# Patient Record
Sex: FEMALE | Race: WHITE | NOT HISPANIC OR LATINO | Employment: OTHER | URBAN - METROPOLITAN AREA
[De-identification: names, ages, dates, MRNs, and addresses within clinical notes are randomized per-mention and may not be internally consistent; named-entity substitution may affect disease eponyms.]

---

## 2017-02-07 PROBLEM — D50.9 IRON DEFICIENCY ANEMIA, UNSPECIFIED: Status: ACTIVE | Noted: 2017-02-07

## 2018-09-18 PROBLEM — D50.0 CHRONIC BLOOD LOSS ANEMIA: Status: ACTIVE | Noted: 2018-09-18

## 2020-04-29 PROBLEM — D64.9 SYMPTOMATIC ANEMIA: Status: ACTIVE | Noted: 2020-04-29

## 2020-04-30 PROBLEM — R06.02 SOB (SHORTNESS OF BREATH): Status: ACTIVE | Noted: 2020-04-30

## 2020-04-30 PROBLEM — J44.1 COPD EXACERBATION: Status: ACTIVE | Noted: 2020-04-30

## 2020-05-01 PROBLEM — R06.00 DYSPNEA: Status: ACTIVE | Noted: 2020-04-30

## 2020-05-03 PROBLEM — I51.89 DIASTOLIC DYSFUNCTION: Status: ACTIVE | Noted: 2020-05-03

## 2020-06-05 PROBLEM — D50.0 IRON DEFICIENCY ANEMIA DUE TO CHRONIC BLOOD LOSS: Status: ACTIVE | Noted: 2020-06-05

## 2021-03-04 PROBLEM — N39.0 UTI (URINARY TRACT INFECTION): Status: ACTIVE | Noted: 2021-03-04

## 2021-03-04 PROBLEM — R11.10 INTRACTABLE VOMITING: Status: ACTIVE | Noted: 2021-03-04

## 2021-03-05 PROBLEM — R06.02 SOB (SHORTNESS OF BREATH): Status: ACTIVE | Noted: 2021-03-05

## 2021-05-06 ENCOUNTER — PATIENT MESSAGE (OUTPATIENT)
Dept: RESEARCH | Facility: HOSPITAL | Age: 76
End: 2021-05-06

## 2021-05-10 ENCOUNTER — PATIENT MESSAGE (OUTPATIENT)
Dept: RESEARCH | Facility: HOSPITAL | Age: 76
End: 2021-05-10

## 2022-05-31 PROBLEM — K21.9 HIATAL HERNIA WITH GERD: Status: ACTIVE | Noted: 2022-05-31

## 2022-05-31 PROBLEM — K21.9 HIATAL HERNIA WITH GASTROESOPHAGEAL REFLUX: Chronic | Status: ACTIVE | Noted: 2022-05-31

## 2022-05-31 PROBLEM — K44.9 HIATAL HERNIA WITH GERD: Status: ACTIVE | Noted: 2022-05-31

## 2022-05-31 PROBLEM — K44.9 HIATAL HERNIA WITH GASTROESOPHAGEAL REFLUX: Chronic | Status: ACTIVE | Noted: 2022-05-31

## 2022-05-31 PROBLEM — K21.9 GASTROESOPHAGEAL REFLUX DISEASE WITHOUT ESOPHAGITIS: Status: ACTIVE | Noted: 2022-05-31

## 2022-05-31 PROBLEM — J43.1 PANLOBULAR EMPHYSEMA: Chronic | Status: ACTIVE | Noted: 2022-05-31

## 2022-05-31 PROBLEM — I10 ESSENTIAL HYPERTENSION: Chronic | Status: ACTIVE | Noted: 2022-05-31

## 2022-06-02 PROBLEM — E63.8 INADEQUATE DIETARY INTAKE OF PROTEIN: Status: ACTIVE | Noted: 2022-06-02

## 2024-09-10 PROBLEM — R29.818 FOCAL NEUROLOGICAL DEFICIT: Status: ACTIVE | Noted: 2024-09-10

## 2024-09-11 PROBLEM — Z79.899 POLYPHARMACY: Status: ACTIVE | Noted: 2024-09-11

## 2024-09-11 PROBLEM — R40.4 ALTERED AWARENESS, TRANSIENT: Status: ACTIVE | Noted: 2024-09-11

## 2024-09-11 PROBLEM — E87.6 HYPOKALEMIA: Status: ACTIVE | Noted: 2024-09-11

## 2024-09-12 PROBLEM — N30.01 ACUTE CYSTITIS WITH HEMATURIA: Status: ACTIVE | Noted: 2024-09-12

## 2024-12-31 ENCOUNTER — HOSPITAL ENCOUNTER (INPATIENT)
Facility: HOSPITAL | Age: 79
LOS: 1 days | Discharge: HOME | DRG: 100 | End: 2025-01-02
Attending: STUDENT IN AN ORGANIZED HEALTH CARE EDUCATION/TRAINING PROGRAM | Admitting: STUDENT IN AN ORGANIZED HEALTH CARE EDUCATION/TRAINING PROGRAM
Payer: MEDICARE

## 2024-12-31 DIAGNOSIS — R07.9 CHEST PAIN: ICD-10-CM

## 2024-12-31 DIAGNOSIS — R56.9 SEIZURES: ICD-10-CM

## 2024-12-31 DIAGNOSIS — R56.9 SEIZURE: Primary | ICD-10-CM

## 2024-12-31 PROBLEM — I50.32 CHRONIC DIASTOLIC HEART FAILURE: Status: ACTIVE | Noted: 2020-05-03

## 2024-12-31 PROBLEM — J44.9 COPD (CHRONIC OBSTRUCTIVE PULMONARY DISEASE): Status: ACTIVE | Noted: 2020-04-30

## 2024-12-31 PROBLEM — G40.909 SEIZURE DISORDER: Status: ACTIVE | Noted: 2024-12-31

## 2024-12-31 PROBLEM — G93.41 ENCEPHALOPATHY, METABOLIC: Status: ACTIVE | Noted: 2024-12-31

## 2024-12-31 PROCEDURE — 63600175 PHARM REV CODE 636 W HCPCS: Performed by: STUDENT IN AN ORGANIZED HEALTH CARE EDUCATION/TRAINING PROGRAM

## 2024-12-31 PROCEDURE — 25000003 PHARM REV CODE 250: Performed by: STUDENT IN AN ORGANIZED HEALTH CARE EDUCATION/TRAINING PROGRAM

## 2024-12-31 PROCEDURE — G0378 HOSPITAL OBSERVATION PER HR: HCPCS

## 2024-12-31 PROCEDURE — G0379 DIRECT REFER HOSPITAL OBSERV: HCPCS

## 2024-12-31 RX ORDER — SODIUM,POTASSIUM PHOSPHATES 280-250MG
2 POWDER IN PACKET (EA) ORAL
Status: DISCONTINUED | OUTPATIENT
Start: 2024-12-31 | End: 2025-01-02 | Stop reason: HOSPADM

## 2024-12-31 RX ORDER — METOPROLOL SUCCINATE 25 MG/1
25 TABLET, EXTENDED RELEASE ORAL DAILY
Status: DISCONTINUED | OUTPATIENT
Start: 2025-01-01 | End: 2025-01-02 | Stop reason: HOSPADM

## 2024-12-31 RX ORDER — BISACODYL 10 MG/1
10 SUPPOSITORY RECTAL DAILY PRN
Status: DISCONTINUED | OUTPATIENT
Start: 2024-12-31 | End: 2025-01-02 | Stop reason: HOSPADM

## 2024-12-31 RX ORDER — LISINOPRIL 20 MG/1
20 TABLET ORAL DAILY
Status: DISCONTINUED | OUTPATIENT
Start: 2025-01-01 | End: 2025-01-02 | Stop reason: HOSPADM

## 2024-12-31 RX ORDER — LEVETIRACETAM 500 MG/1
500 TABLET ORAL 2 TIMES DAILY
Status: DISCONTINUED | OUTPATIENT
Start: 2024-12-31 | End: 2025-01-02 | Stop reason: HOSPADM

## 2024-12-31 RX ORDER — LANOLIN ALCOHOL/MO/W.PET/CERES
800 CREAM (GRAM) TOPICAL
Status: DISCONTINUED | OUTPATIENT
Start: 2024-12-31 | End: 2025-01-02 | Stop reason: HOSPADM

## 2024-12-31 RX ORDER — PANTOPRAZOLE SODIUM 40 MG/1
40 TABLET, DELAYED RELEASE ORAL DAILY
Status: DISCONTINUED | OUTPATIENT
Start: 2025-01-01 | End: 2025-01-02 | Stop reason: HOSPADM

## 2024-12-31 RX ORDER — SIMETHICONE 80 MG
1 TABLET,CHEWABLE ORAL 4 TIMES DAILY PRN
Status: DISCONTINUED | OUTPATIENT
Start: 2024-12-31 | End: 2025-01-02 | Stop reason: HOSPADM

## 2024-12-31 RX ORDER — TALC
6 POWDER (GRAM) TOPICAL NIGHTLY PRN
Status: DISCONTINUED | OUTPATIENT
Start: 2024-12-31 | End: 2025-01-02 | Stop reason: HOSPADM

## 2024-12-31 RX ORDER — ALUMINUM HYDROXIDE, MAGNESIUM HYDROXIDE, AND SIMETHICONE 1200; 120; 1200 MG/30ML; MG/30ML; MG/30ML
30 SUSPENSION ORAL 4 TIMES DAILY PRN
Status: DISCONTINUED | OUTPATIENT
Start: 2024-12-31 | End: 2025-01-02 | Stop reason: HOSPADM

## 2024-12-31 RX ORDER — LORAZEPAM 2 MG/ML
2 INJECTION INTRAMUSCULAR
Status: DISCONTINUED | OUTPATIENT
Start: 2024-12-31 | End: 2025-01-02 | Stop reason: HOSPADM

## 2024-12-31 RX ORDER — CEFTRIAXONE 1 G/1
1 INJECTION, POWDER, FOR SOLUTION INTRAMUSCULAR; INTRAVENOUS
Status: DISCONTINUED | OUTPATIENT
Start: 2024-12-31 | End: 2025-01-02

## 2024-12-31 RX ORDER — ACETAMINOPHEN 325 MG/1
650 TABLET ORAL EVERY 8 HOURS PRN
Status: DISCONTINUED | OUTPATIENT
Start: 2024-12-31 | End: 2025-01-01

## 2024-12-31 RX ORDER — GLUCAGON 1 MG
1 KIT INJECTION
Status: DISCONTINUED | OUTPATIENT
Start: 2024-12-31 | End: 2025-01-02 | Stop reason: HOSPADM

## 2024-12-31 RX ORDER — IBUPROFEN 200 MG
16 TABLET ORAL
Status: DISCONTINUED | OUTPATIENT
Start: 2024-12-31 | End: 2025-01-02 | Stop reason: HOSPADM

## 2024-12-31 RX ORDER — SODIUM CHLORIDE 0.9 % (FLUSH) 0.9 %
10 SYRINGE (ML) INJECTION EVERY 12 HOURS PRN
Status: DISCONTINUED | OUTPATIENT
Start: 2024-12-31 | End: 2025-01-02 | Stop reason: HOSPADM

## 2024-12-31 RX ORDER — NALOXONE HCL 0.4 MG/ML
0.02 VIAL (ML) INJECTION
Status: DISCONTINUED | OUTPATIENT
Start: 2024-12-31 | End: 2025-01-02 | Stop reason: HOSPADM

## 2024-12-31 RX ORDER — ONDANSETRON HYDROCHLORIDE 2 MG/ML
4 INJECTION, SOLUTION INTRAVENOUS EVERY 8 HOURS PRN
Status: DISCONTINUED | OUTPATIENT
Start: 2024-12-31 | End: 2025-01-01

## 2024-12-31 RX ORDER — PRAVASTATIN SODIUM 40 MG/1
40 TABLET ORAL DAILY
Status: DISCONTINUED | OUTPATIENT
Start: 2025-01-01 | End: 2025-01-02 | Stop reason: HOSPADM

## 2024-12-31 RX ORDER — IBUPROFEN 200 MG
24 TABLET ORAL
Status: DISCONTINUED | OUTPATIENT
Start: 2024-12-31 | End: 2025-01-02 | Stop reason: HOSPADM

## 2024-12-31 RX ORDER — AMOXICILLIN 250 MG
1 CAPSULE ORAL DAILY PRN
Status: DISCONTINUED | OUTPATIENT
Start: 2024-12-31 | End: 2025-01-02 | Stop reason: HOSPADM

## 2024-12-31 RX ORDER — ISOSORBIDE MONONITRATE 30 MG/1
30 TABLET, EXTENDED RELEASE ORAL DAILY
Status: DISCONTINUED | OUTPATIENT
Start: 2025-01-01 | End: 2025-01-02 | Stop reason: HOSPADM

## 2024-12-31 RX ORDER — ACETAMINOPHEN 325 MG/1
650 TABLET ORAL EVERY 4 HOURS PRN
Status: DISCONTINUED | OUTPATIENT
Start: 2024-12-31 | End: 2025-01-02 | Stop reason: HOSPADM

## 2024-12-31 RX ADMIN — CEFTRIAXONE 1 G: 1 INJECTION, POWDER, FOR SOLUTION INTRAMUSCULAR; INTRAVENOUS at 09:12

## 2024-12-31 RX ADMIN — LEVETIRACETAM 500 MG: 500 TABLET, FILM COATED ORAL at 09:12

## 2025-01-01 LAB
ALBUMIN SERPL BCP-MCNC: 3.8 G/DL (ref 3.5–5.2)
ALP SERPL-CCNC: 118 U/L (ref 40–150)
ALT SERPL W/O P-5'-P-CCNC: 22 U/L (ref 10–44)
ANION GAP SERPL CALC-SCNC: 15 MMOL/L (ref 8–16)
AST SERPL-CCNC: 31 U/L (ref 10–40)
BASOPHILS # BLD AUTO: 0.02 K/UL (ref 0–0.2)
BASOPHILS NFR BLD: 0.4 % (ref 0–1.9)
BILIRUB SERPL-MCNC: 0.5 MG/DL (ref 0.1–1)
BUN SERPL-MCNC: 9 MG/DL (ref 8–23)
CALCIUM SERPL-MCNC: 8.9 MG/DL (ref 8.7–10.5)
CHLORIDE SERPL-SCNC: 107 MMOL/L (ref 95–110)
CO2 SERPL-SCNC: 21 MMOL/L (ref 23–29)
CREAT SERPL-MCNC: 0.6 MG/DL (ref 0.5–1.4)
DIFFERENTIAL METHOD BLD: ABNORMAL
EOSINOPHIL # BLD AUTO: 0.1 K/UL (ref 0–0.5)
EOSINOPHIL NFR BLD: 1.1 % (ref 0–8)
ERYTHROCYTE [DISTWIDTH] IN BLOOD BY AUTOMATED COUNT: 15 % (ref 11.5–14.5)
EST. GFR  (NO RACE VARIABLE): >60 ML/MIN/1.73 M^2
GLUCOSE SERPL-MCNC: 88 MG/DL (ref 70–110)
HCT VFR BLD AUTO: 36.7 % (ref 37–48.5)
HGB BLD-MCNC: 11.1 G/DL (ref 12–16)
IMM GRANULOCYTES # BLD AUTO: 0.02 K/UL (ref 0–0.04)
IMM GRANULOCYTES NFR BLD AUTO: 0.4 % (ref 0–0.5)
LYMPHOCYTES # BLD AUTO: 0.7 K/UL (ref 1–4.8)
LYMPHOCYTES NFR BLD: 12.6 % (ref 18–48)
MAGNESIUM SERPL-MCNC: 1.8 MG/DL (ref 1.6–2.6)
MCH RBC QN AUTO: 29.4 PG (ref 27–31)
MCHC RBC AUTO-ENTMCNC: 30.2 G/DL (ref 32–36)
MCV RBC AUTO: 97 FL (ref 82–98)
MONOCYTES # BLD AUTO: 0.4 K/UL (ref 0.3–1)
MONOCYTES NFR BLD: 7.5 % (ref 4–15)
NEUTROPHILS # BLD AUTO: 4.2 K/UL (ref 1.8–7.7)
NEUTROPHILS NFR BLD: 78 % (ref 38–73)
NRBC BLD-RTO: 0 /100 WBC
PHOSPHATE SERPL-MCNC: 2.2 MG/DL (ref 2.7–4.5)
PLATELET # BLD AUTO: 189 K/UL (ref 150–450)
PMV BLD AUTO: 10 FL (ref 9.2–12.9)
POTASSIUM SERPL-SCNC: 2.8 MMOL/L (ref 3.5–5.1)
PROT SERPL-MCNC: 7.1 G/DL (ref 6–8.4)
RBC # BLD AUTO: 3.78 M/UL (ref 4–5.4)
SODIUM SERPL-SCNC: 143 MMOL/L (ref 136–145)
WBC # BLD AUTO: 5.32 K/UL (ref 3.9–12.7)

## 2025-01-01 PROCEDURE — 84100 ASSAY OF PHOSPHORUS: CPT | Performed by: STUDENT IN AN ORGANIZED HEALTH CARE EDUCATION/TRAINING PROGRAM

## 2025-01-01 PROCEDURE — 25000003 PHARM REV CODE 250: Performed by: HOSPITALIST

## 2025-01-01 PROCEDURE — 25000003 PHARM REV CODE 250: Performed by: STUDENT IN AN ORGANIZED HEALTH CARE EDUCATION/TRAINING PROGRAM

## 2025-01-01 PROCEDURE — 83735 ASSAY OF MAGNESIUM: CPT | Performed by: STUDENT IN AN ORGANIZED HEALTH CARE EDUCATION/TRAINING PROGRAM

## 2025-01-01 PROCEDURE — 36415 COLL VENOUS BLD VENIPUNCTURE: CPT | Performed by: STUDENT IN AN ORGANIZED HEALTH CARE EDUCATION/TRAINING PROGRAM

## 2025-01-01 PROCEDURE — 11000001 HC ACUTE MED/SURG PRIVATE ROOM

## 2025-01-01 PROCEDURE — 85025 COMPLETE CBC W/AUTO DIFF WBC: CPT | Performed by: STUDENT IN AN ORGANIZED HEALTH CARE EDUCATION/TRAINING PROGRAM

## 2025-01-01 PROCEDURE — 63600175 PHARM REV CODE 636 W HCPCS: Performed by: HOSPITALIST

## 2025-01-01 PROCEDURE — 80053 COMPREHEN METABOLIC PANEL: CPT | Performed by: STUDENT IN AN ORGANIZED HEALTH CARE EDUCATION/TRAINING PROGRAM

## 2025-01-01 PROCEDURE — G0426 INPT/ED TELECONSULT50: HCPCS | Mod: GT,,, | Performed by: PSYCHIATRY & NEUROLOGY

## 2025-01-01 PROCEDURE — 63600175 PHARM REV CODE 636 W HCPCS: Performed by: STUDENT IN AN ORGANIZED HEALTH CARE EDUCATION/TRAINING PROGRAM

## 2025-01-01 RX ORDER — DOCUSATE SODIUM 100 MG/1
100 CAPSULE, LIQUID FILLED ORAL DAILY
Status: DISCONTINUED | OUTPATIENT
Start: 2025-01-01 | End: 2025-01-02 | Stop reason: HOSPADM

## 2025-01-01 RX ORDER — POLYETHYLENE GLYCOL 3350 17 G/17G
17 POWDER, FOR SOLUTION ORAL 2 TIMES DAILY PRN
Status: DISCONTINUED | OUTPATIENT
Start: 2025-01-01 | End: 2025-01-02 | Stop reason: HOSPADM

## 2025-01-01 RX ORDER — ONDANSETRON HYDROCHLORIDE 2 MG/ML
4 INJECTION, SOLUTION INTRAVENOUS EVERY 4 HOURS PRN
Status: DISCONTINUED | OUTPATIENT
Start: 2025-01-01 | End: 2025-01-02 | Stop reason: HOSPADM

## 2025-01-01 RX ORDER — ACETAMINOPHEN 325 MG/1
650 TABLET ORAL EVERY 6 HOURS PRN
Status: DISCONTINUED | OUTPATIENT
Start: 2025-01-01 | End: 2025-01-02 | Stop reason: HOSPADM

## 2025-01-01 RX ORDER — MIRTAZAPINE 7.5 MG/1
7.5 TABLET, FILM COATED ORAL NIGHTLY PRN
Status: DISCONTINUED | OUTPATIENT
Start: 2025-01-01 | End: 2025-01-02 | Stop reason: HOSPADM

## 2025-01-01 RX ORDER — POTASSIUM CHLORIDE 20 MEQ/1
60 TABLET, EXTENDED RELEASE ORAL 2 TIMES DAILY
Status: COMPLETED | OUTPATIENT
Start: 2025-01-01 | End: 2025-01-01

## 2025-01-01 RX ORDER — POTASSIUM CHLORIDE 20 MEQ/1
60 TABLET, EXTENDED RELEASE ORAL 2 TIMES DAILY
Status: DISCONTINUED | OUTPATIENT
Start: 2025-01-01 | End: 2025-01-01

## 2025-01-01 RX ORDER — DIPHENHYDRAMINE HCL 25 MG
50 CAPSULE ORAL EVERY 6 HOURS PRN
Status: DISCONTINUED | OUTPATIENT
Start: 2025-01-01 | End: 2025-01-02 | Stop reason: HOSPADM

## 2025-01-01 RX ORDER — BENZONATATE 100 MG/1
100 CAPSULE ORAL 3 TIMES DAILY PRN
Status: DISCONTINUED | OUTPATIENT
Start: 2025-01-01 | End: 2025-01-02 | Stop reason: HOSPADM

## 2025-01-01 RX ORDER — GUAIFENESIN AND DEXTROMETHORPHAN HYDROBROMIDE 10; 100 MG/5ML; MG/5ML
5 SYRUP ORAL EVERY 6 HOURS PRN
Status: DISCONTINUED | OUTPATIENT
Start: 2025-01-01 | End: 2025-01-02 | Stop reason: HOSPADM

## 2025-01-01 RX ADMIN — CEFTRIAXONE 1 G: 1 INJECTION, POWDER, FOR SOLUTION INTRAMUSCULAR; INTRAVENOUS at 09:01

## 2025-01-01 RX ADMIN — METOPROLOL SUCCINATE 25 MG: 25 TABLET, EXTENDED RELEASE ORAL at 09:01

## 2025-01-01 RX ADMIN — LISINOPRIL 20 MG: 20 TABLET ORAL at 09:01

## 2025-01-01 RX ADMIN — PRAVASTATIN SODIUM 40 MG: 40 TABLET ORAL at 09:01

## 2025-01-01 RX ADMIN — LEVETIRACETAM 500 MG: 500 TABLET, FILM COATED ORAL at 09:01

## 2025-01-01 RX ADMIN — ISOSORBIDE MONONITRATE 30 MG: 30 TABLET, EXTENDED RELEASE ORAL at 09:01

## 2025-01-01 RX ADMIN — PANTOPRAZOLE SODIUM 40 MG: 40 TABLET, DELAYED RELEASE ORAL at 09:01

## 2025-01-01 RX ADMIN — POTASSIUM CHLORIDE 60 MEQ: 1500 TABLET, EXTENDED RELEASE ORAL at 09:01

## 2025-01-01 RX ADMIN — DOCUSATE SODIUM 100 MG: 100 CAPSULE, LIQUID FILLED ORAL at 02:01

## 2025-01-01 RX ADMIN — ONDANSETRON 4 MG: 2 INJECTION INTRAMUSCULAR; INTRAVENOUS at 05:01

## 2025-01-01 NOTE — SUBJECTIVE & OBJECTIVE
Past Medical History:   Diagnosis Date    Anemia     Arthritis     Asthma with status asthmaticus     C. difficile colitis     history    COPD (chronic obstructive pulmonary disease)     Depression     Encounter for blood transfusion     Esophageal stricture     Gastritis     GERD (gastroesophageal reflux disease)     Hiatal hernia     HTN (hypertension)     Hypercholesterolemia     Iron deficiency     Irregular heart beat     Migraine     Mitral valve prolapse     Osteopenia determined by x-ray     Reflux esophagitis     Unspecified asthma        Past Surgical History:   Procedure Laterality Date    BLADDER SUSPENSION      CATARACT EXTRACTION      COLONOSCOPY      COLONOSCOPY N/A 9/18/2018    Procedure: COLONOSCOPY;  Surgeon: Vadim Jacob MD;  Location: [facility];  Service: Endoscopy;  Laterality: N/A;    COLONOSCOPY N/A 6/5/2020    Procedure: COLONOSCOPY;  Surgeon: Vadim Jacob MD;  Location: [facility];  Service: Endoscopy;  Laterality: N/A;    DILATION AND CURETTAGE OF UTERUS      DILATION AND CURETTAGE OF UTERUS      ESOPHAGEAL DILATION      ESOPHAGOGASTRODUODENOSCOPY      ESOPHAGOGASTRODUODENOSCOPY N/A 9/18/2018    Procedure: ESOPHAGOGASTRODUODENOSCOPY (EGD);  Surgeon: Vadim Jacob MD;  Location: [facility];  Service: Endoscopy;  Laterality: N/A;    ESOPHAGOGASTRODUODENOSCOPY N/A 6/5/2020    Procedure: ESOPHAGOGASTRODUODENOSCOPY (EGD);  Surgeon: Vadim Jacob MD;  Location: [facility];  Service: Endoscopy;  Laterality: N/A;    FIXATION KYPHOPLASTY LUMBAR SPINE      HYSTERECTOMY      intestinal obstruction surgery  1995    TOTAL ABDOMINAL HYSTERECTOMY W/ BILATERAL SALPINGOOPHORECTOMY         Review of patient's allergies indicates:   Allergen Reactions    Bactrim [sulfamethoxazole-trimethoprim] Anaphylaxis    Dilaudid (pf) [hydromorphone (pf)] Anaphylaxis    Hydromorphone Anaphylaxis    Meperidine Anaphylaxis    Penicillins Anaphylaxis    Bananas [banana] Diarrhea    Ciprofloxacin      Eggs [egg derived] Diarrhea    Iodine and iodide containing products     Milk containing products (dairy) Diarrhea    Pork/porcine containing products Diarrhea    Demerol (pf) [meperidine (pf)] Palpitations    Kenalog-h Palpitations    Percodan [oxycodone-aspirin] Palpitations       Current Facility-Administered Medications on File Prior to Encounter   Medication    diphenhydrAMINE (BENADRYL) 50 mg/mL injection    [COMPLETED] etomidate injection 15 mg    [COMPLETED] fentaNYL 50 mcg/mL injection 200 mcg    [COMPLETED] levETIRAcetam in NaCl (iso-os) IVPB 2,000 mg    methylPREDNISolone sodium succinate (SOLU-MEDROL) 125 mg injection    ondansetron 4 mg/2 mL injection    [COMPLETED] rocuronium injection 50 mg    [DISCONTINUED] dexmedetomidine (PRECEDEX) 400mcg/100mL 0.9% NaCL infusion    [DISCONTINUED] etomidate injection 10 mg    [DISCONTINUED] levETIRAcetam in NaCl (iso-os) IVPB 1,000 mg    [DISCONTINUED] LORazepam (ATIVAN) 2 mg/mL injection    [DISCONTINUED] propofol (DIPRIVAN) 10 mg/mL infusion    [DISCONTINUED] propofol (DIPRIVAN) 10 mg/mL infusion    [DISCONTINUED] rocuronium injection 3,140 mg     Current Outpatient Medications on File Prior to Encounter   Medication Sig    acetaminophen (TYLENOL) 325 MG tablet Take 2 tablets (650 mg total) by mouth every 6 (six) hours as needed for Pain.    albuterol (PROVENTIL/VENTOLIN HFA) 90 mcg/actuation inhaler Inhale 2 puffs into the lungs every 6 (six) hours as needed for Wheezing.    albuterol-ipratropium (DUO-NEB) 2.5 mg-0.5 mg/3 mL nebulizer solution Take 3 mLs by nebulization every 6 (six) hours. Rescue    docusate sodium (COLACE) 100 MG capsule Take 100 mg by mouth daily as needed for Constipation.    ferrous gluconate (FERGON) 324 MG tablet Take 1 tablet (324 mg total) by mouth daily with breakfast.    isosorbide mononitrate (IMDUR) 30 MG 24 hr tablet Take 30 mg by mouth once daily.     Lactobacillus rhamnosus GG (CULTURELLE) 10 billion cell capsule Take 1 capsule  by mouth once daily.    lisinopril (PRINIVIL,ZESTRIL) 20 MG tablet Take 20 mg by mouth once daily.    metoprolol succinate (TOPROL-XL) 25 MG 24 hr tablet Take 1 tablet by mouth once daily.    OXYGEN-AIR DELIVERY SYSTEMS MISC by [facility].(Non-Drug; Combo Route) route.    pantoprazole (PROTONIX) 40 MG tablet Take 40 mg by mouth once daily.    pravastatin (PRAVACHOL) 40 MG tablet Take 40 mg by mouth once daily.    predniSONE (DELTASONE) 20 MG tablet Take 3 tablets (60 mg total) by mouth once daily. for 5 days    SYMBICORT 160-4.5 mcg/actuation HFAA Inhale 1 puff into the lungs 2 (two) times daily.     Family History       Problem Relation (Age of Onset)    Heart disease Mother, Father          Tobacco Use    Smoking status: Never    Smokeless tobacco: Never   Substance and Sexual Activity    Alcohol use: Yes     Comment: rare    Drug use: No    Sexual activity: Not Currently     Review of Systems   Unable to perform ROS: Mental status change     Objective:     Vital Signs (Most Recent):  Temp: 98.8 °F (37.1 °C) (12/31/24 2044)  Pulse: 98 (12/31/24 2044)  Resp: 18 (12/31/24 2044)  BP: (!) 173/79 (12/31/24 2044)  SpO2: 100 % (12/31/24 2044) Vital Signs (24h Range):  Temp:  [98 °F (36.7 °C)-98.8 °F (37.1 °C)] 98.8 °F (37.1 °C)  Pulse:  [] 98  Resp:  [6-25] 18  SpO2:  [97 %-100 %] 100 %  BP: ()/() 173/79        There is no height or weight on file to calculate BMI.     Physical Exam  Vitals and nursing note reviewed.   Constitutional:       General: She is not in acute distress.     Appearance: She is not ill-appearing.   HENT:      Mouth/Throat:      Mouth: Mucous membranes are moist.   Cardiovascular:      Rate and Rhythm: Normal rate.   Pulmonary:      Effort: Pulmonary effort is normal.   Abdominal:      General: Abdomen is flat.   Skin:     General: Skin is warm.   Neurological:      Mental Status: She is alert.      Comments: A&Ox1. Confused.   Able to move all extremities   Slow response to following  some commands.                   Significant Labs: All pertinent labs within the past 24 hours have been reviewed.    Significant Imaging: I have reviewed all pertinent imaging results/findings within the past 24 hours.

## 2025-01-01 NOTE — ASSESSMENT & PLAN NOTE
- Echocardiogram (4/30/2020):   Final Impressions   1. The study quality is good.   2. Global left ventricular systolic function is normal. The left   ventricular ejection fraction is 55-60%.    3. Right ventricular systolic function is normal. Right ventricular   diastolic dimension is 3 cms.    4. The left atrium is severely enlarged. Left atrial diameter is 4.7   cms.    5. Mild (1+) mitral regurgitation. Mild (1+) tricuspid regurgitation.   6. The pulmonary artery systolic pressure is 40 mmHg.   7. LV wall motion and systolic function are normal with abnormal LV   diastolic function.     No acute issues

## 2025-01-01 NOTE — HPI
This is an 79-year-old female with a past medical history of COPD, hypertension, HFpEF (EF: 55%), SVT, seizure disorder, depression, GERD, who presents with altered mental status.    Patient presented to [facility] ED for evaluation of altered mental status that started a day prior to presentation.  Reportedly, the patient was acting bizarre, nonverbal at times and wandering around the house aimlessly.  Family denies prior history of seizures. She had a presentation to the ED on 12/29 for shortness of breath and was diagnosed with a COPD exacerbation, for which he was prescribed prednisone and albuterol, however, did not take her prednisone.     In the ED, patient was tachycardic (110s > 100s), with fluctuating blood pressure (90-190s/50s-120s).  Labs were remarkable for normocytic anemia (10.5), but otherwise unremarkable.  UDS was positive for opiates and barbiturates.  UA showed +2 leukocyte esterase, 45 WBCs, occasional bacteria.  CT head showed no acute intracranial abnormality.  While in CT, the patient had a tonic-clonic seizure that lasted for about 3-5 minutes and was intubated for airway protection.  She was placed on propofol and Precedex, that was later awake and following commands.  Sedation was subsequently held and she was extubated shortly after.  Patient was given Keppra 2 g IV, along with paralytic/sedation required for intubation.  She was transferred to [facility] for further management.

## 2025-01-01 NOTE — ASSESSMENT & PLAN NOTE
Could be in the setting of seizure disorder/possible UTI  CT head showed no acute process.   Brain MRI ordered  Neurology consulted

## 2025-01-01 NOTE — PLAN OF CARE
Inpatient Upgrade Note    Nanette Bucio has warranted treatment spanning two or more midnights of hospital level care for the management of  UTI . She continues to require IV antibiotics. Her condition is also complicated by the following comorbidities: Hypertension, Chronic respiratory disease, and Heart failure.

## 2025-01-01 NOTE — NURSING
[facility]  Nurses Note -- 4 Eyes      1/1/2025       Skin assessed on: Q Shift      [x] No Pressure Injuries Present    [x]Prevention Measures Documented    [] Yes LDA  for Pressure Injury Previously documented     [] Yes New Pressure Injury Discovered   [] LDA for New Pressure Injury Added      Attending RN:  Antonella Harris RN     Second RN:  Juana DELCID RN

## 2025-01-01 NOTE — TELEMEDICINE CONSULT
[facility]   General Neurology  Consult Note      Consult Information  Inpatient consult to Telemedicine-General Neurology  Consult performed by: Zachariah Hampton MD  Consult ordered by: Jessica Goddard MD  Reason for consult: Seizure        Consulting Provider: JOEL BRAY III   Current Providers  No providers found    Patient Location:  [facility] TELEMET[facility] IP [facility]    Spoke hospital nurse at bedside with patient assisting consultant.  Patient information was obtained from patient.       Neurology Documentation       Blood pressure 137/80, pulse 76, temperature 98.4 °F (36.9 °C), temperature source Oral, resp. rate 12, height 5' (1.524 m), weight 62.8 kg (138 lb 7.2 oz), SpO2 100%.    Medical Decision Making  HPI:  79 y.o. female with medical Hx of COPD, depression, migraine, GERD was brought to ED due to confusion. Pt was at an outside facility initially. She was sent to head CT where reportedly had a seizure that lasted around 5 minutes. Per notes pt was intubated for a short period of time. Pt was then transferred to [facility] for neurology evaluation. She is now alert and fully oriented. No new medications except Norco.     Images personally reviewed and interpreted:  Study: Head CT  Study Interpretation: FINDINGS:  No evidence of an acute hemorrhage, infarction or mass effect.  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  Old lacunar infarct or dilated perivascular space inferior margin right lentiform nucleus.     Some of the images are degraded secondary to motion artifact.     Impression:     No acute intracranial abnormalities     No significant change from the prior study        Electronically signed by:Rahel Encinas MD  Date:                                            12/31/2024  Time:                                           08:30    Additional studies reviewed:   Study: Cardiac_Neuro: EEG  Study Interpretation: Pending    Laboratory studies reviewed:  BMP:   Lab Results    Component Value Date    GLUCOSE 88 12/29/2021     01/01/2025    K 2.8 (L) 01/01/2025     01/01/2025    CO2 21 (L) 01/01/2025    BUN 9 01/01/2025    CREATININE 0.6 01/01/2025    CALCIUM 8.9 01/01/2025     CBC:   Lab Results   Component Value Date    WBC 5.32 01/01/2025    RBC 3.78 (L) 01/01/2025    HGB 11.1 (L) 01/01/2025    HCT 36.7 (L) 01/01/2025     01/01/2025    MCV 97 01/01/2025    MCH 29.4 01/01/2025    MCHC 30.2 (L) 01/01/2025     Lipid Panel:   Lab Results   Component Value Date    CHOL 191 09/10/2024    LDLCALC 87 09/10/2024    HDL 79 (H) 09/10/2024    TRIG 124 09/10/2024     Coagulation:   Lab Results   Component Value Date    INR 1.0 09/10/2024    APTT 25.8 09/10/2024     Hgb A1C:   Lab Results   Component Value Date    HGBA1C 4.6 03/05/2021     TSH:   Lab Results   Component Value Date    TSH 4.43 07/24/2024       Documentation personally reviewed:  Notes: Notes: ED notes and H&P     Assessment and plan:    80 y/o female with reported new-onset seizures. Etiology if unclear. No metabolic disturbances. She is on ceftriaxone for UTI. Noted barbiturates in UDS but none on her list of home meds.    -OK for levetiracetam 500 gm BID  -MRI brain to see is any structural abnormalities that may cause seizures  -EEG.    Post charge discharge plan:  Clinic follow up: General Neurology    Visit Type: in-person only  Timeframe: 4 weeks        Additional Physical Exam, History, & ROS  Review of Systems   Constitutional:  Negative for fever.   HENT:  Negative for hearing loss.    Eyes:  Negative for blurred vision.   Respiratory:  Negative for cough.    Cardiovascular:  Negative for chest pain.   Gastrointestinal:  Negative for abdominal pain.   Genitourinary:  Negative for dysuria.   Musculoskeletal:  Negative for neck pain.   Skin:  Negative for rash.   Neurological:  Negative for headaches.   Psychiatric/Behavioral:  Negative for suicidal ideas.      Physical Exam  Constitutional:       General:  She is not in acute distress.  Pulmonary:      Effort: No respiratory distress.   Neurological:      Mental Status: She is alert and oriented to person, place, and time.      Cranial Nerves: No dysarthria or facial asymmetry.      Motor: No tremor or pronator drift.   Past Medical History:   Diagnosis Date    Anemia     Arthritis     Asthma with status asthmaticus     C. difficile colitis     history    COPD (chronic obstructive pulmonary disease)     Depression     Encounter for blood transfusion     Esophageal stricture     Gastritis     GERD (gastroesophageal reflux disease)     Hiatal hernia     HTN (hypertension)     Hypercholesterolemia     Iron deficiency     Irregular heart beat     Migraine     Mitral valve prolapse     Osteopenia determined by x-ray     Reflux esophagitis     Unspecified asthma      Past Surgical History:   Procedure Laterality Date    BLADDER SUSPENSION      CATARACT EXTRACTION      COLONOSCOPY      COLONOSCOPY N/A 9/18/2018    Procedure: COLONOSCOPY;  Surgeon: Vadim Jacob MD;  Location: [facility];  Service: Endoscopy;  Laterality: N/A;    COLONOSCOPY N/A 6/5/2020    Procedure: COLONOSCOPY;  Surgeon: Vadim Jacob MD;  Location: [facility];  Service: Endoscopy;  Laterality: N/A;    DILATION AND CURETTAGE OF UTERUS      DILATION AND CURETTAGE OF UTERUS      ESOPHAGEAL DILATION      ESOPHAGOGASTRODUODENOSCOPY      ESOPHAGOGASTRODUODENOSCOPY N/A 9/18/2018    Procedure: ESOPHAGOGASTRODUODENOSCOPY (EGD);  Surgeon: Vadim Jacob MD;  Location: [facility];  Service: Endoscopy;  Laterality: N/A;    ESOPHAGOGASTRODUODENOSCOPY N/A 6/5/2020    Procedure: ESOPHAGOGASTRODUODENOSCOPY (EGD);  Surgeon: Vadim Jacob MD;  Location: [facility];  Service: Endoscopy;  Laterality: N/A;    FIXATION KYPHOPLASTY LUMBAR SPINE      HYSTERECTOMY      intestinal obstruction surgery  1995    TOTAL ABDOMINAL HYSTERECTOMY W/ BILATERAL SALPINGOOPHORECTOMY        Family History   Problem Relation Name Age of Onset    Heart disease Mother      Heart disease Father         Diagnoses  No problems updated.    Zachariah Hampton MD    Neurology consultation requested by spoke provider. Audiovisual encounter with the patient performed using a secure connection.  Results and impressions from the visit are documented on this note and were communicated to the consulting provider/team via direct communication. The note has been shared for addition to the patients electronic medical record.

## 2025-01-01 NOTE — H&P
[facility] Medicine  History & Physical    Patient Name: Nanette Bucio  MRN: [id number]  Patient Class: OP- Observation  Admission Date: 12/31/2024  Attending Physician: Thomas Jacobs MD   Primary Care Provider: Goran Le MD         Patient information was obtained from patient, relative(s), and ER records.     Subjective:     Principal Problem:Seizure    Chief Complaint: No chief complaint on file.       HPI: This is an 79-year-old female with a past medical history of COPD, hypertension, HFpEF (EF: 55%), SVT, seizure disorder, depression, GERD, who presents with altered mental status.    Patient presented to [facility] ED for evaluation of altered mental status that started a day prior to presentation.  Reportedly, the patient was acting bizarre, nonverbal at times and wandering around the house aimlessly.  Family denies prior history of seizures. She had a presentation to the ED on 12/29 for shortness of breath and was diagnosed with a COPD exacerbation, for which he was prescribed prednisone and albuterol, however, did not take her prednisone.     In the ED, patient was tachycardic (110s > 100s), with fluctuating blood pressure (90-190s/50s-120s).  Labs were remarkable for normocytic anemia (10.5), but otherwise unremarkable.  UDS was positive for opiates and barbiturates.  UA showed +2 leukocyte esterase, 45 WBCs, occasional bacteria.  CT head showed no acute intracranial abnormality.  While in CT, the patient had a tonic-clonic seizure that lasted for about 3-5 minutes and was intubated for airway protection.  She was placed on propofol and Precedex, that was later awake and following commands.  Sedation was subsequently held and she was extubated shortly after.  Patient was given Keppra 2 g IV, along with paralytic/sedation required for intubation.  She was transferred to [facility] for further management.    Past Medical History:   Diagnosis Date    Anemia      Arthritis     Asthma with status asthmaticus     C. difficile colitis     history    COPD (chronic obstructive pulmonary disease)     Depression     Encounter for blood transfusion     Esophageal stricture     Gastritis     GERD (gastroesophageal reflux disease)     Hiatal hernia     HTN (hypertension)     Hypercholesterolemia     Iron deficiency     Irregular heart beat     Migraine     Mitral valve prolapse     Osteopenia determined by x-ray     Reflux esophagitis     Unspecified asthma        Past Surgical History:   Procedure Laterality Date    BLADDER SUSPENSION      CATARACT EXTRACTION      COLONOSCOPY      COLONOSCOPY N/A 9/18/2018    Procedure: COLONOSCOPY;  Surgeon: Vadim Jacob MD;  Location: [facility];  Service: Endoscopy;  Laterality: N/A;    COLONOSCOPY N/A 6/5/2020    Procedure: COLONOSCOPY;  Surgeon: Vadim Jacob MD;  Location: [facility];  Service: Endoscopy;  Laterality: N/A;    DILATION AND CURETTAGE OF UTERUS      DILATION AND CURETTAGE OF UTERUS      ESOPHAGEAL DILATION      ESOPHAGOGASTRODUODENOSCOPY      ESOPHAGOGASTRODUODENOSCOPY N/A 9/18/2018    Procedure: ESOPHAGOGASTRODUODENOSCOPY (EGD);  Surgeon: Vadim Jacob MD;  Location: [facility];  Service: Endoscopy;  Laterality: N/A;    ESOPHAGOGASTRODUODENOSCOPY N/A 6/5/2020    Procedure: ESOPHAGOGASTRODUODENOSCOPY (EGD);  Surgeon: Vadim Jacob MD;  Location: [facility];  Service: Endoscopy;  Laterality: N/A;    FIXATION KYPHOPLASTY LUMBAR SPINE      HYSTERECTOMY      intestinal obstruction surgery  1995    TOTAL ABDOMINAL HYSTERECTOMY W/ BILATERAL SALPINGOOPHORECTOMY         Review of patient's allergies indicates:   Allergen Reactions    Bactrim [sulfamethoxazole-trimethoprim] Anaphylaxis    Dilaudid (pf) [hydromorphone (pf)] Anaphylaxis    Hydromorphone Anaphylaxis    Meperidine Anaphylaxis    Penicillins Anaphylaxis    Bananas [banana] Diarrhea    Ciprofloxacin     Eggs [egg derived] Diarrhea    Iodine and iodide  containing products     Milk containing products (dairy) Diarrhea    Pork/porcine containing products Diarrhea    Demerol (pf) [meperidine (pf)] Palpitations    Kenalog-h Palpitations    Percodan [oxycodone-aspirin] Palpitations       Current Facility-Administered Medications on File Prior to Encounter   Medication    diphenhydrAMINE (BENADRYL) 50 mg/mL injection    [COMPLETED] etomidate injection 15 mg    [COMPLETED] fentaNYL 50 mcg/mL injection 200 mcg    [COMPLETED] levETIRAcetam in NaCl (iso-os) IVPB 2,000 mg    methylPREDNISolone sodium succinate (SOLU-MEDROL) 125 mg injection    ondansetron 4 mg/2 mL injection    [COMPLETED] rocuronium injection 50 mg    [DISCONTINUED] dexmedetomidine (PRECEDEX) 400mcg/100mL 0.9% NaCL infusion    [DISCONTINUED] etomidate injection 10 mg    [DISCONTINUED] levETIRAcetam in NaCl (iso-os) IVPB 1,000 mg    [DISCONTINUED] LORazepam (ATIVAN) 2 mg/mL injection    [DISCONTINUED] propofol (DIPRIVAN) 10 mg/mL infusion    [DISCONTINUED] propofol (DIPRIVAN) 10 mg/mL infusion    [DISCONTINUED] rocuronium injection 3,140 mg     Current Outpatient Medications on File Prior to Encounter   Medication Sig    acetaminophen (TYLENOL) 325 MG tablet Take 2 tablets (650 mg total) by mouth every 6 (six) hours as needed for Pain.    albuterol (PROVENTIL/VENTOLIN HFA) 90 mcg/actuation inhaler Inhale 2 puffs into the lungs every 6 (six) hours as needed for Wheezing.    albuterol-ipratropium (DUO-NEB) 2.5 mg-0.5 mg/3 mL nebulizer solution Take 3 mLs by nebulization every 6 (six) hours. Rescue    docusate sodium (COLACE) 100 MG capsule Take 100 mg by mouth daily as needed for Constipation.    ferrous gluconate (FERGON) 324 MG tablet Take 1 tablet (324 mg total) by mouth daily with breakfast.    isosorbide mononitrate (IMDUR) 30 MG 24 hr tablet Take 30 mg by mouth once daily.     Lactobacillus rhamnosus GG (CULTURELLE) 10 billion cell capsule Take 1 capsule by mouth once daily.    lisinopril  (PRINIVIL,ZESTRIL) 20 MG tablet Take 20 mg by mouth once daily.    metoprolol succinate (TOPROL-XL) 25 MG 24 hr tablet Take 1 tablet by mouth once daily.    OXYGEN-AIR DELIVERY SYSTEMS MISC by [facility].(Non-Drug; Combo Route) route.    pantoprazole (PROTONIX) 40 MG tablet Take 40 mg by mouth once daily.    pravastatin (PRAVACHOL) 40 MG tablet Take 40 mg by mouth once daily.    predniSONE (DELTASONE) 20 MG tablet Take 3 tablets (60 mg total) by mouth once daily. for 5 days    SYMBICORT 160-4.5 mcg/actuation HFAA Inhale 1 puff into the lungs 2 (two) times daily.     Family History       Problem Relation (Age of Onset)    Heart disease Mother, Father          Tobacco Use    Smoking status: Never    Smokeless tobacco: Never   Substance and Sexual Activity    Alcohol use: Yes     Comment: rare    Drug use: No    Sexual activity: Not Currently     Review of Systems   Unable to perform ROS: Mental status change     Objective:     Vital Signs (Most Recent):  Temp: 98.8 °F (37.1 °C) (12/31/24 2044)  Pulse: 98 (12/31/24 2044)  Resp: 18 (12/31/24 2044)  BP: (!) 173/79 (12/31/24 2044)  SpO2: 100 % (12/31/24 2044) Vital Signs (24h Range):  Temp:  [98 °F (36.7 °C)-98.8 °F (37.1 °C)] 98.8 °F (37.1 °C)  Pulse:  [] 98  Resp:  [6-25] 18  SpO2:  [97 %-100 %] 100 %  BP: ()/() 173/79        There is no height or weight on file to calculate BMI.     Physical Exam  Vitals and nursing note reviewed.   Constitutional:       General: She is not in acute distress.     Appearance: She is not ill-appearing.   HENT:      Mouth/Throat:      Mouth: Mucous membranes are moist.   Cardiovascular:      Rate and Rhythm: Normal rate.   Pulmonary:      Effort: Pulmonary effort is normal.   Abdominal:      General: Abdomen is flat.   Skin:     General: Skin is warm.   Neurological:      Mental Status: She is alert.      Comments: A&Ox1. Confused.   Able to move all extremities   Slow response to following some commands.                    Significant Labs: All pertinent labs within the past 24 hours have been reviewed.    Significant Imaging: I have reviewed all pertinent imaging results/findings within the past 24 hours.  Assessment/Plan:     * Seizure  Continue Keppra   Neurology consult   EEG   Seizure precautions   Ativan PRN     Encephalopathy, metabolic  Could be in the setting of seizure disorder/possible UTI  CT head showed no acute process.   Brain MRI ordered  Neurology consulted     Essential hypertension  Patient's blood pressure range in the last 24 hours was: BP  Min: 95/62  Max: 191/87.The patient's inpatient anti-hypertensive regimen is listed below:  Current Antihypertensives       Plan  - BP is controlled, no changes needed to their regimen      Hiatal hernia with GERD  Continue Protonix       UTI (urinary tract infection)  Urinalysis  Recent Labs   Lab 12/31/24  0234   COLORU Yellow   SPECGRAV 1.025   PHUR 5.5   PROTEINUA Trace*   BACTERIA Occasional   NITRITE Negative   LEUKOCYTESUR 2+*   UROBILINOGEN Negative       Will keep on empiric ceftriaxone   Follow up cultures, de-escalate when appropriate       Chronic diastolic heart failure  - Echocardiogram (4/30/2020):   Final Impressions   1. The study quality is good.   2. Global left ventricular systolic function is normal. The left   ventricular ejection fraction is 55-60%.    3. Right ventricular systolic function is normal. Right ventricular   diastolic dimension is 3 cms.    4. The left atrium is severely enlarged. Left atrial diameter is 4.7   cms.    5. Mild (1+) mitral regurgitation. Mild (1+) tricuspid regurgitation.   6. The pulmonary artery systolic pressure is 40 mmHg.   7. LV wall motion and systolic function are normal with abnormal LV   diastolic function.     No acute issues     COPD (chronic obstructive pulmonary disease)  Patient's COPD is controlled currently.  Patient is currently off COPD Pathway.  Duo-Nebs PRN       VTE Risk Mitigation (From admission,  onward)           Ordered     IP VTE HIGH RISK PATIENT  Once         12/31/24 2048     Place sequential compression device  Until discontinued         12/31/24 2048                       On 12/31/2024, patient should be placed in hospital observation services under my care.             Diego Amin MD  Department of Hospital Medicine  [facility]

## 2025-01-01 NOTE — ASSESSMENT & PLAN NOTE
Urinalysis  Recent Labs   Lab 12/31/24  0234   COLORU Yellow   SPECGRAV 1.025   PHUR 5.5   PROTEINUA Trace*   BACTERIA Occasional   NITRITE Negative   LEUKOCYTESUR 2+*   UROBILINOGEN Negative       Will keep on empiric ceftriaxone   Follow up cultures, de-escalate when appropriate

## 2025-01-01 NOTE — SUBJECTIVE & OBJECTIVE
HPI:  79 y.o. female with medical Hx of COPD, depression, migraine, GERD was brought to ED due to confusion. Pt was at an outside facility initially. She was sent to head CT where reportedly had a seizure that lasted around 5 minutes. Per notes pt was intubated for a short period of time. Pt was then transferred to [facility] for neurology evaluation. She is now alert and fully oriented. No new medications except Norco.     Images personally reviewed and interpreted:  Study: Head CT  Study Interpretation: FINDINGS:  No evidence of an acute hemorrhage, infarction or mass effect.  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  Old lacunar infarct or dilated perivascular space inferior margin right lentiform nucleus.     Some of the images are degraded secondary to motion artifact.     Impression:     No acute intracranial abnormalities     No significant change from the prior study        Electronically signed by:Rahel Encinas MD  Date:                                            12/31/2024  Time:                                           08:30    Additional studies reviewed:   Study: Cardiac_Neuro: EEG  Study Interpretation: Pending    Laboratory studies reviewed:  BMP:   Lab Results   Component Value Date    GLUCOSE 88 12/29/2021     01/01/2025    K 2.8 (L) 01/01/2025     01/01/2025    CO2 21 (L) 01/01/2025    BUN 9 01/01/2025    CREATININE 0.6 01/01/2025    CALCIUM 8.9 01/01/2025     CBC:   Lab Results   Component Value Date    WBC 5.32 01/01/2025    RBC 3.78 (L) 01/01/2025    HGB 11.1 (L) 01/01/2025    HCT 36.7 (L) 01/01/2025     01/01/2025    MCV 97 01/01/2025    MCH 29.4 01/01/2025    MCHC 30.2 (L) 01/01/2025     Lipid Panel:   Lab Results   Component Value Date    CHOL 191 09/10/2024    LDLCALC 87 09/10/2024    HDL 79 (H) 09/10/2024    TRIG 124 09/10/2024     Coagulation:   Lab Results   Component Value Date    INR 1.0 09/10/2024    APTT 25.8 09/10/2024     Hgb A1C:   Lab Results    Component Value Date    HGBA1C 4.6 03/05/2021     TSH:   Lab Results   Component Value Date    TSH 4.43 07/24/2024       Documentation personally reviewed:  Notes: Notes: ED notes and H&P     Assessment and plan:    78 y/o female with reported new-onset seizures. Etiology if unclear. No metabolic disturbances. She is on ceftriaxone for UTI. Noted barbiturates in UDS but none on her list of home meds.    -OK for levetiracetam 500 gm BID  -MRI brain to see is any structural abnormalities that may cause seizures  -EEG.    Post charge discharge plan:  Clinic follow up: General Neurology    Visit Type: in-person only  Timeframe: 4 weeks

## 2025-01-01 NOTE — ASSESSMENT & PLAN NOTE
Patient's blood pressure range in the last 24 hours was: BP  Min: 95/62  Max: 191/87.The patient's inpatient anti-hypertensive regimen is listed below:  Current Antihypertensives       Plan  - BP is controlled, no changes needed to their regimen

## 2025-01-02 VITALS
TEMPERATURE: 98 F | DIASTOLIC BLOOD PRESSURE: 65 MMHG | HEIGHT: 60 IN | SYSTOLIC BLOOD PRESSURE: 139 MMHG | RESPIRATION RATE: 18 BRPM | OXYGEN SATURATION: 100 % | WEIGHT: 138.44 LBS | BODY MASS INDEX: 27.18 KG/M2 | HEART RATE: 85 BPM

## 2025-01-02 LAB
ANION GAP SERPL CALC-SCNC: 11 MMOL/L (ref 8–16)
BUN SERPL-MCNC: 9 MG/DL (ref 8–23)
CALCIUM SERPL-MCNC: 9.2 MG/DL (ref 8.7–10.5)
CHLORIDE SERPL-SCNC: 109 MMOL/L (ref 95–110)
CO2 SERPL-SCNC: 21 MMOL/L (ref 23–29)
CREAT SERPL-MCNC: 0.6 MG/DL (ref 0.5–1.4)
EST. GFR  (NO RACE VARIABLE): >60 ML/MIN/1.73 M^2
GLUCOSE SERPL-MCNC: 98 MG/DL (ref 70–110)
POTASSIUM SERPL-SCNC: 4.7 MMOL/L (ref 3.5–5.1)
SODIUM SERPL-SCNC: 141 MMOL/L (ref 136–145)

## 2025-01-02 PROCEDURE — 95819 EEG AWAKE AND ASLEEP: CPT | Mod: 26,,, | Performed by: INTERNAL MEDICINE

## 2025-01-02 PROCEDURE — 95819 EEG AWAKE AND ASLEEP: CPT

## 2025-01-02 PROCEDURE — 25000003 PHARM REV CODE 250: Performed by: STUDENT IN AN ORGANIZED HEALTH CARE EDUCATION/TRAINING PROGRAM

## 2025-01-02 PROCEDURE — 36415 COLL VENOUS BLD VENIPUNCTURE: CPT | Performed by: HOSPITALIST

## 2025-01-02 PROCEDURE — 63600175 PHARM REV CODE 636 W HCPCS: Performed by: HOSPITALIST

## 2025-01-02 PROCEDURE — 25000003 PHARM REV CODE 250: Performed by: HOSPITALIST

## 2025-01-02 PROCEDURE — 80048 BASIC METABOLIC PNL TOTAL CA: CPT | Performed by: HOSPITALIST

## 2025-01-02 RX ORDER — MUPIROCIN 20 MG/G
OINTMENT TOPICAL 2 TIMES DAILY
Status: DISCONTINUED | OUTPATIENT
Start: 2025-01-02 | End: 2025-01-02 | Stop reason: HOSPADM

## 2025-01-02 RX ORDER — CEFTRIAXONE 1 G/1
1 INJECTION, POWDER, FOR SOLUTION INTRAMUSCULAR; INTRAVENOUS
Status: DISCONTINUED | OUTPATIENT
Start: 2025-01-02 | End: 2025-01-02 | Stop reason: HOSPADM

## 2025-01-02 RX ORDER — LEVETIRACETAM 500 MG/1
500 TABLET ORAL 2 TIMES DAILY
Qty: 60 TABLET | Refills: 2 | Status: SHIPPED | OUTPATIENT
Start: 2025-01-02 | End: 2025-04-02

## 2025-01-02 RX ADMIN — PANTOPRAZOLE SODIUM 40 MG: 40 TABLET, DELAYED RELEASE ORAL at 08:01

## 2025-01-02 RX ADMIN — ONDANSETRON 4 MG: 2 INJECTION INTRAMUSCULAR; INTRAVENOUS at 12:01

## 2025-01-02 RX ADMIN — LEVETIRACETAM 500 MG: 500 TABLET, FILM COATED ORAL at 08:01

## 2025-01-02 RX ADMIN — MUPIROCIN: 20 OINTMENT TOPICAL at 08:01

## 2025-01-02 RX ADMIN — ISOSORBIDE MONONITRATE 30 MG: 30 TABLET, EXTENDED RELEASE ORAL at 08:01

## 2025-01-02 RX ADMIN — PRAVASTATIN SODIUM 40 MG: 40 TABLET ORAL at 08:01

## 2025-01-02 RX ADMIN — METOPROLOL SUCCINATE 25 MG: 25 TABLET, EXTENDED RELEASE ORAL at 08:01

## 2025-01-02 RX ADMIN — CEFTRIAXONE 1 G: 1 INJECTION, POWDER, FOR SOLUTION INTRAMUSCULAR; INTRAVENOUS at 06:01

## 2025-01-02 RX ADMIN — LISINOPRIL 20 MG: 20 TABLET ORAL at 08:01

## 2025-01-02 NOTE — PLAN OF CARE
Case Management Assessment     PCP: Goran Le  Pharmacy: [facility]    Patient Arrived From: [facility]  Existing Help at Home: Family    Barriers to Discharge: None    Discharge Plan:    A. Home with family   B. Home with family    Pt is independent and uses oxygen. Pt's sonGene assists as needed and will provide transportation home when discharged.      01/02/25 1028   Discharge Assessment   Assessment Type Discharge Planning Assessment   Confirmed/corrected address, phone number and insurance Yes   Confirmed Demographics Correct on Facesheet   Source of Information patient   When was your last doctors appointment?   (unknown)   Communicated LANDY with patient/caregiver Yes   Reason For Admission Seizure   People in Home child(niyah), adult;spouse   Facility Arrived From: [facility]   Do you expect to return to your current living situation? Yes   Do you have help at home or someone to help you manage your care at home? Yes   Who are your caregiver(s) and their phone number(s)? Gene- son [phone]   Prior to hospitilization cognitive status: Alert/Oriented   Current cognitive status: Alert/Oriented   Walking or Climbing Stairs Difficulty no   Dressing/Bathing Difficulty no   Equipment Currently Used at Home oxygen   Readmission within 30 days? No   Patient currently being followed by outpatient case management? No   Do you currently have service(s) that help you manage your care at home? No   Do you take prescription medications? Yes   Do you have prescription coverage? Yes   Coverage [employer] [facility] [employer] CHOICES   Do you have any problems affording any of your prescribed medications? No   Is the patient taking medications as prescribed? yes   Who is going to help you get home at discharge? family   How do you get to doctors appointments? family or friend will provide   Are you on dialysis? No   Do you take coumadin? No   Discharge Plan A Home with family   Discharge  Plan B Home with family   DME Needed Upon Discharge  none   Discharge Plan discussed with: Patient   Transition of Care Barriers None   SDOH   (none)   Physical Activity   On average, how many days per week do you engage in moderate to strenuous exercise (like a brisk walk)? 0 days   On average, how many minutes do you engage in exercise at this level? 0 min   OTHER   Name(s) of People in Home Gene- son

## 2025-01-02 NOTE — NURSING
[facility]  Nurses Note -- 4 Eyes      1/2/2025       Skin assessed on: Q Shift      [x] No Pressure Injuries Present    [x]Prevention Measures Documented    [] Yes LDA  for Pressure Injury Previously documented     [] Yes New Pressure Injury Discovered   [] LDA for New Pressure Injury Added      Attending RN:  Antonella Harris RN     Second RN:  Juana DELCID RN

## 2025-01-02 NOTE — PROCEDURES
EEG Extended Monitoring up to one hour    Date/Time: 1/2/2025 9:15 AM    Performed by: Erick Valles MD  Authorized by: Diego Amin MD        ELECTROENCEPHALOGRAM REPORT    DATE OF SERVICE: 01/02/2025  EEG NUMBER: OW   LOCATION OF SERVICE: [facility]   Electroencephalographic (EEG) recording is with electrodes placed according to the International 10-20 placement system.  Thirty two (32) channels of digital signal (sampling rate of 512/sec) including T1 and T2 was simultaneously recorded from the scalp and may include  EKG, EMG, and/or eye monitors.  Recording band pass was 0.1 to 512 hz.  Digital video recording of the patient is simultaneously recorded with the EEG.  The patient is instructed report clinical symptoms which may occur during the recording session.  EEG and video recording is stored and archived in digital format. Activation procedures which include photic stimulation, hyperventilation and instructing patients to perform simple task are done in selected patients.    The EEG is displayed on a monitor screen and can be reviewed using different montages.  Computer assisted analysis is employed to detect spike and electrographic seizure activity.   The entire record is submitted for computer analysis.  The entire recording is visually reviewed and the times identified by computer analysis as being spikes or seizures are reviewed again.  Compresses spectral analysis (CSA) is also performed on the activity recorded from each individual channel.  This is displayed as a power display of frequencies from 0 to 30 Hz over time.   The CSA is reviewed looking for asymmetries in power between homologous areas of the scalp and then compared with the original EEG recording.     [employer] software was also utilized in the review of this study.  This software suite analyzes the EEG recording in multiple domains.  Coherence and rhythmicity is computed to identify EEG sections which may contain  organized seizures.  Each channel undergoes analysis to detect presence of spike and sharp waves which have special and morphological characteristic of epileptic activity.  The routine EEG recording is converted from spacial into frequency domain.  This is then displayed comparing homologous areas to identify areas of significant asymmetry.  Algorithm to identify non-cortically generated artifact is used to separate eye movement, EMG and other artifact from the EEG        EEG FINDINGS  During the maximally alert state, a 9-10 Hz posterior dominant rhythm was seen which was symmetrical, well-regulated and briskly attenuated to eye opening. In the more anterior head regions, symmetric frontocentral beta frequencies predominated. As drowsiness occurred, the posterior dominant rhythm attenuated, slow rolling eye movements appeared, and symmetrical vertex sharp transients were seen. Stage N2 sleep was reached and was characterized proc by high amplitude K-complexes and 12-15 Hz symmetrical and synchronous frontocentral sleep spindles.  Excessive beta range activity was noted throughout the record.    No epileptiform findings were noted, no electrographic seizures were seen, and no clinical events were reported.    Activation Procedures not performed.      IMPRESSION:  Normal awake and asleep  Excessive beta activity    CLINICAL CORRELATION:  This is a normal EEG in awake and asleep states.  There were no epileptiform findings, electrographic seizures, or no clinical events recorded. An EEG without epileptiform findings does not exclude the possibility of epilepsy. If the clinical suspicion of epilepsy is high, a repeat EEG after sleep depreivation, following a seizure, or a prolonged EEG may increase the frequency of detecting epileptiform discharges. The excessive beta is likely a medication side-effect, and can be seen with benzodiazepine and barbiturate administration.      Erick Valles MD  Neurology  [facility]

## 2025-01-02 NOTE — DISCHARGE SUMMARY
[facility] Medicine  Discharge Summary      Patient Name: Nanette Bucio  MRN: [id number]  ANNE: [id number]  Patient Class: IP- Inpatient  Admission Date: 12/31/2024  Hospital Length of Stay: 1 days  Discharge Date and Time:  01/02/2025 8:58 AM  Attending Physician: Jessica Goddard, *   Discharging Provider: Jessica Goddard MD  Primary Care Provider: Goran Le MD    Primary Care Team: Networked reference to record PCT     HPI:   This is an 79-year-old female with a past medical history of COPD, hypertension, HFpEF (EF: 55%), SVT, seizure disorder, depression, GERD, who presents with altered mental status.    Patient presented to [place] ED for evaluation of altered mental status that started a day prior to presentation.  Reportedly, the patient was acting bizarre, nonverbal at times and wandering around the house aimlessly.  Family denies prior history of seizures. She had a presentation to the ED on 12/29 for shortness of breath and was diagnosed with a COPD exacerbation, for which he was prescribed prednisone and albuterol, however, did not take her prednisone.     In the ED, patient was tachycardic (110s > 100s), with fluctuating blood pressure (90-190s/50s-120s).  Labs were remarkable for normocytic anemia (10.5), but otherwise unremarkable.  UDS was positive for opiates and barbiturates.  UA showed +2 leukocyte esterase, 45 WBCs, occasional bacteria.  CT head showed no acute intracranial abnormality.  While in CT, the patient had a tonic-clonic seizure that lasted for about 3-5 minutes and was intubated for airway protection.  She was placed on propofol and Precedex, that was later awake and following commands.  Sedation was subsequently held and she was extubated shortly after.  Patient was given Keppra 2 g IV, along with paralytic/sedation required for intubation.  She was transferred to [facility] for further management.    * No surgery found *       Hospital Course:   This is an 79-year-old female with a past medical history of COPD, hypertension, HFpEF (EF: 55%), SVT, seizure disorder, depression, GERD, who presents with altered mental status.  In the ED, patient was tachycardic (110s > 100s), with fluctuating blood pressure (90-190s/50s-120s).  Labs were remarkable for normocytic anemia (10.5), but otherwise unremarkable.  UDS was positive for opiates and barbiturates.  UA showed +2 leukocyte esterase, 45 WBCs, occasional bacteria.  CT head showed no acute intracranial abnormality.  While in CT, the patient had a tonic-clonic seizure that lasted for about 3-5 minutes and was intubated for airway protection.  She was placed on propofol and Precedex, that was later awake and following commands.  Sedation was subsequently held and she was extubated shortly after.  Patient was given Keppra 2 g IV, along with paralytic/sedation required for intubation.patient was admitted to hospital and remains stable on RA,was received IV Abx for possible UTI,but urine culture was negative,  Neurology was consulted,  Had unremarkable MRI brain  EEG show no seizure activity,per neurology patient was on po Keppra.  Patient was closely monitored  and no seizure activity was seen,patient was discharged home with keppra and follow up with PCP and neurology as out patient.     Goals of Care Treatment Preferences:  Code Status: Full Code      SDOH Screening:  The patient declined to be screened for utility difficulties, food insecurity, transport difficulties, housing insecurity, and interpersonal safety, so no concerns could be identified this admission.     Consults:   Consults (From admission, onward)          Status Ordering Provider     Inpatient consult to Telemedicine-General Neurology  Once        Provider:  Zachariah Hampton MD    Completed JESUS MORRIS            * Seizure  Continue Keppra   Neurology consult   EEG   Seizure precautions   Ativan PRN      Encephalopathy, metabolic  Could be in the setting of seizure disorder/possible UTI  CT head showed no acute process.   Brain MRI ordered  Neurology consulted .  Patient will have EEG and MRI brain,neurology is consulted.    Essential hypertension  Patient's blood pressure range in the last 24 hours was: BP  Min: 95/62  Max: 191/87.The patient's inpatient anti-hypertensive regimen is listed below:  Current Antihypertensives       Plan  - BP is controlled, no changes needed to their regimen      Hiatal hernia with GERD  Continue Protonix       UTI (urinary tract infection)  Urinalysis  Recent Labs   Lab 12/31/24  0234   COLORU Yellow   SPECGRAV 1.025   PHUR 5.5   PROTEINUA Trace*   BACTERIA Occasional   NITRITE Negative   LEUKOCYTESUR 2+*   UROBILINOGEN Negative       Will keep on empiric ceftriaxone   Follow up cultures, de-escalate when appropriate       Chronic diastolic heart failure  - Echocardiogram (4/30/2020):   Final Impressions   1. The study quality is good.   2. Global left ventricular systolic function is normal. The left   ventricular ejection fraction is 55-60%.    3. Right ventricular systolic function is normal. Right ventricular   diastolic dimension is 3 cms.    4. The left atrium is severely enlarged. Left atrial diameter is 4.7   cms.    5. Mild (1+) mitral regurgitation. Mild (1+) tricuspid regurgitation.   6. The pulmonary artery systolic pressure is 40 mmHg.   7. LV wall motion and systolic function are normal with abnormal LV   diastolic function.     No acute issues     COPD (chronic obstructive pulmonary disease)  Patient's COPD is controlled currently.  Patient is currently off COPD Pathway.  Duo-Nebs PRN       Final Active Diagnoses:    Diagnosis Date Noted POA    PRINCIPAL PROBLEM:  Seizure [R56.9] 12/31/2024 Yes    Encephalopathy, metabolic [G93.41] 12/31/2024 Yes    Essential hypertension [I10] 05/31/2022 Yes     Chronic    Hiatal hernia with GERD [K44.9, K21.9] 05/31/2022 Yes     UTI (urinary tract infection) [N39.0] 03/04/2021 Yes    Chronic diastolic heart failure [I50.32] 05/03/2020 Yes    COPD (chronic obstructive pulmonary disease) [J44.9] 04/30/2020 Yes      Problems Resolved During this Admission:       Discharged Condition: stable    Disposition: Home or Self Care    Follow Up:   Follow-up Information       Goran Le MD Follow up in 1 week(s).    Specialties: Hospitalist, Internal Medicine  Contact information:  [place] [address] [phone]                           Patient Instructions:      Ambulatory referral/consult to Neurology   Standing Status: Future   Referral Priority: Routine Referral Type: Consultation   Referral Reason: Specialty Services Required   Requested Specialty: Neurology   Number of Visits Requested: 1     Activity as tolerated       Significant Diagnostic Studies: Labs: BMP:   Recent Labs   Lab 01/01/25  0525 01/02/25  0651   GLU 88 98    141   K 2.8* 4.7    109   CO2 21* 21*   BUN 9 9   CREATININE 0.6 0.6   CALCIUM 8.9 9.2   MG 1.8  --    , CMP   Recent Labs   Lab 01/01/25  0525 01/02/25  0651    141   K 2.8* 4.7    109   CO2 21* 21*   GLU 88 98   BUN 9 9   CREATININE 0.6 0.6   CALCIUM 8.9 9.2   PROT 7.1  --    ALBUMIN 3.8  --    BILITOT 0.5  --    ALKPHOS 118  --    AST 31  --    ALT 22  --    ANIONGAP 15 11   , and CBC   Recent Labs   Lab 01/01/25  0525   WBC 5.32   HGB 11.1*   HCT 36.7*        Microbiology: Blood Culture   Lab Results   Component Value Date    LABBLOO No growth after 5 days. 05/17/2022    and Urine Culture    Lab Results   Component Value Date    LABURIN No significant growth 12/31/2024     Radiology: MRI: brain   CT scan:  head       Pending Diagnostic Studies:       None           Medications:  Reconciled Home Medications:      Medication List        START taking these medications      levETIRAcetam 500 MG Tab  Commonly known as: KEPPRA  Take 1 tablet (500 mg total) by mouth 2 (two)  times daily.            CONTINUE taking these medications      acetaminophen 325 MG tablet  Commonly known as: TYLENOL  Take 2 tablets (650 mg total) by mouth every 6 (six) hours as needed for Pain.     albuterol 90 mcg/actuation inhaler  Commonly known as: PROVENTIL/VENTOLIN HFA  Inhale 2 puffs into the lungs every 6 (six) hours as needed for Wheezing.     albuterol-ipratropium 2.5 mg-0.5 mg/3 mL nebulizer solution  Commonly known as: DUO-NEB  Take 3 mLs by nebulization every 6 (six) hours. Rescue     docusate sodium 100 MG capsule  Commonly known as: COLACE  Take 100 mg by mouth daily as needed for Constipation.     ferrous gluconate 324 MG tablet  Commonly known as: FERGON  Take 1 tablet (324 mg total) by mouth daily with breakfast.     isosorbide mononitrate 30 MG 24 hr tablet  Commonly known as: IMDUR  Take 30 mg by mouth once daily.     Lactobacillus rhamnosus GG 10 billion cell capsule  Commonly known as: CULTURELLE  Take 1 capsule by mouth once daily.     lisinopriL 20 MG tablet  Commonly known as: PRINIVIL,ZESTRIL  Take 20 mg by mouth once daily.     metoprolol succinate 25 MG 24 hr tablet  Commonly known as: TOPROL-XL  Take 1 tablet by mouth once daily.     OXYGEN-AIR DELIVERY SYSTEMS MISC  by Misc.(Non-Drug; Combo Route) route.     pantoprazole 40 MG tablet  Commonly known as: PROTONIX  Take 40 mg by mouth once daily.     pravastatin 40 MG tablet  Commonly known as: PRAVACHOL  Take 40 mg by mouth once daily.     SYMBICORT 160-4.5 mcg/actuation Hfaa  Generic drug: budesonide-formoterol 160-4.5 mcg  Inhale 1 puff into the lungs 2 (two) times daily.            STOP taking these medications      predniSONE 20 MG tablet  Commonly known as: DELTASONE              Indwelling Lines/Drains at time of discharge:   Lines/Drains/Airways       Airway  Duration                  Airway - Non-Surgical 12/31/24 0326 2 days                    Time spent on the discharge of patient: over 30  minutes         Jessica  MD Rupesh  Department of [facility] Medicine  [facility] [place]

## 2025-01-02 NOTE — PROGRESS NOTES
[facility] Medicine  Progress Note    Patient Name: Nanette Bucio  MRN: [id number]  Patient Class: IP- Inpatient   Admission Date: 12/31/2024  Length of Stay: 1 days  Attending Physician: Jessica Goddard, *  Primary Care Provider: Goran Le MD        Subjective     Principal Problem:Seizure        HPI:  This is an 79-year-old female with a past medical history of COPD, hypertension, HFpEF (EF: 55%), SVT, seizure disorder, depression, GERD, who presents with altered mental status.    Patient presented to [place] ED for evaluation of altered mental status that started a day prior to presentation.  Reportedly, the patient was acting bizarre, nonverbal at times and wandering around the house aimlessly.  Family denies prior history of seizures. She had a presentation to the ED on 12/29 for shortness of breath and was diagnosed with a COPD exacerbation, for which he was prescribed prednisone and albuterol, however, did not take her prednisone.     In the ED, patient was tachycardic (110s > 100s), with fluctuating blood pressure (90-190s/50s-120s).  Labs were remarkable for normocytic anemia (10.5), but otherwise unremarkable.  UDS was positive for opiates and barbiturates.  UA showed +2 leukocyte esterase, 45 WBCs, occasional bacteria.  CT head showed no acute intracranial abnormality.  While in CT, the patient had a tonic-clonic seizure that lasted for about 3-5 minutes and was intubated for airway protection.  She was placed on propofol and Precedex, that was later awake and following commands.  Sedation was subsequently held and she was extubated shortly after.  Patient was given Keppra 2 g IV, along with paralytic/sedation required for intubation.  She was transferred to [facility] for further management.    Overview/Hospital Course:  This is an 79-year-old female with a past medical history of COPD, hypertension, HFpEF (EF: 55%), SVT, seizure disorder, depression, GERD,  who presents with altered mental status.  In the ED, patient was tachycardic (110s > 100s), with fluctuating blood pressure (90-190s/50s-120s).  Labs were remarkable for normocytic anemia (10.5), but otherwise unremarkable.  UDS was positive for opiates and barbiturates.  UA showed +2 leukocyte esterase, 45 WBCs, occasional bacteria.  CT head showed no acute intracranial abnormality.  While in CT, the patient had a tonic-clonic seizure that lasted for about 3-5 minutes and was intubated for airway protection.  She was placed on propofol and Precedex, that was later awake and following commands.  Sedation was subsequently held and she was extubated shortly after.  Patient was given Keppra 2 g IV, along with paralytic/sedation required for intubation.  Patient will have EEG and MRI brain,neurology is consulted.    Interval History: Patient will have EEG and MRI brain,neurology is consulted.    Review of Systems   Constitutional:  Positive for activity change and appetite change.   Respiratory:  Negative for apnea and chest tightness.    Gastrointestinal:  Negative for abdominal distention and abdominal pain.   Genitourinary:  Negative for difficulty urinating and dyspareunia.     Objective:     Vital Signs (Most Recent):  Temp: 98.4 °F (36.9 °C) (01/02/25 0740)  Pulse: 91 (01/02/25 0740)  Resp: 18 (01/02/25 0740)  BP: (!) 148/67 (01/02/25 0740)  SpO2: 99 % (01/02/25 0740) Vital Signs (24h Range):  Temp:  [97.8 °F (36.6 °C)-98.9 °F (37.2 °C)] 98.4 °F (36.9 °C)  Pulse:  [] 91  Resp:  [16-20] 18  SpO2:  [96 %-99 %] 99 %  BP: (139-179)/(67-80) 148/67     Weight: 62.8 kg (138 lb 7.2 oz)  Body mass index is 27.04 kg/m².    Intake/Output Summary (Last 24 hours) at 1/2/2025 0850  Last data filed at 1/2/2025 0447  Gross per 24 hour   Intake --   Output 450 ml   Net -450 ml         Physical Exam  Cardiovascular:      Rate and Rhythm: Normal rate.   Skin:     Coloration: Skin is not jaundiced.   Neurological:      General:  No focal deficit present.      Mental Status: She is alert.             Significant Labs: All pertinent labs within the past 24 hours have been reviewed.  BMP:   Recent Labs   Lab 01/01/25  0525 01/02/25  0651   GLU 88 98    141   K 2.8* 4.7    109   CO2 21* 21*   BUN 9 9   CREATININE 0.6 0.6   CALCIUM 8.9 9.2   MG 1.8  --      CBC:   Recent Labs   Lab 01/01/25  0525   WBC 5.32   HGB 11.1*   HCT 36.7*        CMP:   Recent Labs   Lab 01/01/25  0525 01/02/25  0651    141   K 2.8* 4.7    109   CO2 21* 21*   GLU 88 98   BUN 9 9   CREATININE 0.6 0.6   CALCIUM 8.9 9.2   PROT 7.1  --    ALBUMIN 3.8  --    BILITOT 0.5  --    ALKPHOS 118  --    AST 31  --    ALT 22  --    ANIONGAP 15 11       Significant Imaging: I have reviewed all pertinent imaging results/findings within the past 24 hours.    Assessment and Plan     * Seizure  Continue Keppra   Neurology consult   EEG   Seizure precautions   Ativan PRN     Encephalopathy, metabolic  Could be in the setting of seizure disorder/possible UTI  CT head showed no acute process.   Brain MRI ordered  Neurology consulted .  Patient will have EEG and MRI brain,neurology is consulted.    Essential hypertension  Patient's blood pressure range in the last 24 hours was: BP  Min: 95/62  Max: 191/87.The patient's inpatient anti-hypertensive regimen is listed below:  Current Antihypertensives       Plan  - BP is controlled, no changes needed to their regimen      Hiatal hernia with GERD  Continue Protonix       UTI (urinary tract infection)  Urinalysis  Recent Labs   Lab 12/31/24  0234   COLORU Yellow   SPECGRAV 1.025   PHUR 5.5   PROTEINUA Trace*   BACTERIA Occasional   NITRITE Negative   LEUKOCYTESUR 2+*   UROBILINOGEN Negative       Will keep on empiric ceftriaxone   Follow up cultures, de-escalate when appropriate       Chronic diastolic heart failure  - Echocardiogram (4/30/2020):   Final Impressions   1. The study quality is good.   2. Global left  ventricular systolic function is normal. The left   ventricular ejection fraction is 55-60%.    3. Right ventricular systolic function is normal. Right ventricular   diastolic dimension is 3 cms.    4. The left atrium is severely enlarged. Left atrial diameter is 4.7   cms.    5. Mild (1+) mitral regurgitation. Mild (1+) tricuspid regurgitation.   6. The pulmonary artery systolic pressure is 40 mmHg.   7. LV wall motion and systolic function are normal with abnormal LV   diastolic function.     No acute issues     COPD (chronic obstructive pulmonary disease)  Patient's COPD is controlled currently.  Patient is currently off COPD Pathway.  Duo-Nebs PRN       VTE Risk Mitigation (From admission, onward)           Ordered     IP VTE HIGH RISK PATIENT  Once         12/31/24 2048     Place sequential compression device  Until discontinued         12/31/24 2048                    Discharge Planning   LANDY:      Code Status: Full Code   Medical Readiness for Discharge Date:              1.1.2025              Jessica Goddard MD  Department of Hospital Medicine   [facility] [place]

## 2025-01-02 NOTE — SUBJECTIVE & OBJECTIVE
Interval History: Patient will have EEG and MRI brain,neurology is consulted.    Review of Systems   Constitutional:  Positive for activity change and appetite change.   Respiratory:  Negative for apnea and chest tightness.    Gastrointestinal:  Negative for abdominal distention and abdominal pain.   Genitourinary:  Negative for difficulty urinating and dyspareunia.     Objective:     Vital Signs (Most Recent):  Temp: 98.4 °F (36.9 °C) (01/02/25 0740)  Pulse: 91 (01/02/25 0740)  Resp: 18 (01/02/25 0740)  BP: (!) 148/67 (01/02/25 0740)  SpO2: 99 % (01/02/25 0740) Vital Signs (24h Range):  Temp:  [97.8 °F (36.6 °C)-98.9 °F (37.2 °C)] 98.4 °F (36.9 °C)  Pulse:  [] 91  Resp:  [16-20] 18  SpO2:  [96 %-99 %] 99 %  BP: (139-179)/(67-80) 148/67     Weight: 62.8 kg (138 lb 7.2 oz)  Body mass index is 27.04 kg/m².    Intake/Output Summary (Last 24 hours) at 1/2/2025 0850  Last data filed at 1/2/2025 0447  Gross per 24 hour   Intake --   Output 450 ml   Net -450 ml         Physical Exam  Cardiovascular:      Rate and Rhythm: Normal rate.   Skin:     Coloration: Skin is not jaundiced.   Neurological:      General: No focal deficit present.      Mental Status: She is alert.             Significant Labs: All pertinent labs within the past 24 hours have been reviewed.  BMP:   Recent Labs   Lab 01/01/25 0525 01/02/25 0651   GLU 88 98    141   K 2.8* 4.7    109   CO2 21* 21*   BUN 9 9   CREATININE 0.6 0.6   CALCIUM 8.9 9.2   MG 1.8  --      CBC:   Recent Labs   Lab 01/01/25 0525   WBC 5.32   HGB 11.1*   HCT 36.7*        CMP:   Recent Labs   Lab 01/01/25 0525 01/02/25 0651    141   K 2.8* 4.7    109   CO2 21* 21*   GLU 88 98   BUN 9 9   CREATININE 0.6 0.6   CALCIUM 8.9 9.2   PROT 7.1  --    ALBUMIN 3.8  --    BILITOT 0.5  --    ALKPHOS 118  --    AST 31  --    ALT 22  --    ANIONGAP 15 11       Significant Imaging: I have reviewed all pertinent imaging results/findings within the past 24  hours.   No

## 2025-01-02 NOTE — PLAN OF CARE
Case Management Final Discharge Note    Discharge Disposition: Home    New DME ordered / company name: None    Relevant SDOH / Transition of Care Barriers:  None    Primary Caretaker and contact information: Micky sanchez     Scheduled followup appointment: PCP- staff will call pt to schedule.    Referrals placed: Neurology scheduled    Transportation: Family    Patient and family educated on discharge services and updated on DC plan. Bedside RN notified, patient clear to discharge from Case Management Perspective.       01/02/25 1047   Final Note   Assessment Type Final Discharge Note   Anticipated Discharge Disposition Home   What phone number can be called within the next 1-3 days to see how you are doing after discharge? [phone]   Hospital Resources/Appts/Education Provided Appointments scheduled and added to AVS   Post-Acute Status   [facility]age [employer] [facility] CHOICES   Discharge Delays None known at this time

## 2025-01-02 NOTE — HOSPITAL COURSE
This is an 79-year-old female with a past medical history of COPD, hypertension, HFpEF (EF: 55%), SVT, seizure disorder, depression, GERD, who presents with altered mental status.  In the ED, patient was tachycardic (110s > 100s), with fluctuating blood pressure (90-190s/50s-120s).  Labs were remarkable for normocytic anemia (10.5), but otherwise unremarkable.  UDS was positive for opiates and barbiturates.  UA showed +2 leukocyte esterase, 45 WBCs, occasional bacteria.  CT head showed no acute intracranial abnormality.  While in CT, the patient had a tonic-clonic seizure that lasted for about 3-5 minutes and was intubated for airway protection.  She was placed on propofol and Precedex, that was later awake and following commands.  Sedation was subsequently held and she was extubated shortly after.  Patient was given Keppra 2 g IV, along with paralytic/sedation required for intubation.patient was admitted to hospital and remains stable on RA,was received IV Abx for possible UTI,but urine culture was negative,  Neurology was consulted,  Had unremarkable MRI brain  EEG show no seizure activity,per neurology patient was on po Keppra.  Patient was closely monitored  and no seizure activity was seen,patient was discharged home with keppra and follow up with PCP and neurology as out patient.